# Patient Record
Sex: MALE | Race: WHITE | NOT HISPANIC OR LATINO
[De-identification: names, ages, dates, MRNs, and addresses within clinical notes are randomized per-mention and may not be internally consistent; named-entity substitution may affect disease eponyms.]

---

## 2022-07-14 PROBLEM — Z00.00 ENCOUNTER FOR PREVENTIVE HEALTH EXAMINATION: Status: ACTIVE | Noted: 2022-07-14

## 2022-07-15 ENCOUNTER — NON-APPOINTMENT (OUTPATIENT)
Age: 47
End: 2022-07-15

## 2022-07-15 ENCOUNTER — APPOINTMENT (OUTPATIENT)
Dept: INTERNAL MEDICINE | Facility: CLINIC | Age: 47
End: 2022-07-15

## 2022-07-15 VITALS
WEIGHT: 168.08 LBS | HEIGHT: 65.55 IN | SYSTOLIC BLOOD PRESSURE: 131 MMHG | OXYGEN SATURATION: 98 % | HEART RATE: 75 BPM | BODY MASS INDEX: 27.67 KG/M2 | TEMPERATURE: 98.5 F | DIASTOLIC BLOOD PRESSURE: 73 MMHG

## 2022-07-15 DIAGNOSIS — J45.909 UNSPECIFIED ASTHMA, UNCOMPLICATED: ICD-10-CM

## 2022-07-15 DIAGNOSIS — I73.00 RAYNAUD'S SYNDROME W/OUT GANGRENE: ICD-10-CM

## 2022-07-15 DIAGNOSIS — F90.9 ATTENTION-DEFICIT HYPERACTIVITY DISORDER, UNSPECIFIED TYPE: ICD-10-CM

## 2022-07-15 PROCEDURE — 99386 PREV VISIT NEW AGE 40-64: CPT | Mod: 25

## 2022-07-15 PROCEDURE — G0444 DEPRESSION SCREEN ANNUAL: CPT | Mod: 59

## 2022-07-15 PROCEDURE — 36415 COLL VENOUS BLD VENIPUNCTURE: CPT

## 2022-07-15 NOTE — ADDENDUM
[FreeTextEntry1] : I, Andreas Kolb, acted as a scribe on behalf of Dr. Dmitry Lopez MD, on 07/15/2022. \par \par All medical entries made by the scribe were at my, Dr. Dmitry Lopez MD, direction and personally dictated by me on 07/15/2022. I have reviewed the chart and agree that the record accurately reflects my personal performance of the history, physical exam, assessment and plan. I have also personally directed, reviewed, and agreed with the chart.

## 2022-07-15 NOTE — HISTORY OF PRESENT ILLNESS
[FreeTextEntry1] : Patient presents to establish care and annual physical exam.  [de-identified] : A 47 y/o M pt presents to office with Hx of Prolactinoma, diagnosed 2012 taking medication. He was having headaches and weight gain.\par Currently denies any headaches, double vision. Has not had MRI and f/u with endocrinology.\par Hx of asthma which has been stable. Triggered every time he gets sick, after exercise and seasonal allergies symptom include chest tightness. States he's having 1 episode per year.\par \par Patient c/o cold sensitivity on hand and feet. Toes does change in white yellowish after massaging turns back to red. Painful with cold weather. Denies any joint pain or thickening of skin.\par He does have dribbling. Denies any other urinary symptom or change in bowel habits.\par Interested in Colonoscopy. UTD with optometrist.\par SocHx: non smoker, social alcohol use\par FHx:Grandfather scleroderma, Father arthritis, Unicuspid cousin

## 2022-07-15 NOTE — ASSESSMENT
[FreeTextEntry1] : Annual Physical Exam\par -Blood work done today\par -Check A1c, lipid panel and Vitamin levels.\par -EKG done today with no abnormalities.\par -Check PSA levels.\par -Neurology and psychiatry referral given Hx of ADHD.\par -Advised to f/u with endocrinology given Hx of prolactinoma. Check prolactin levels\par -Name of GI provided for Colonoscopy.\par -Medication refills provided.\par -RTO annually or as needed

## 2022-07-15 NOTE — PHYSICAL EXAM
[No Acute Distress] : no acute distress [Well Nourished] : well nourished [Well Developed] : well developed [Well-Appearing] : well-appearing [Normal Sclera/Conjunctiva] : normal sclera/conjunctiva [PERRL] : pupils equal round and reactive to light [EOMI] : extraocular movements intact [Normal Outer Ear/Nose] : the outer ears and nose were normal in appearance [Normal Oropharynx] : the oropharynx was normal [No JVD] : no jugular venous distention [No Lymphadenopathy] : no lymphadenopathy [Supple] : supple [Thyroid Normal, No Nodules] : the thyroid was normal and there were no nodules present [No Respiratory Distress] : no respiratory distress  [No Accessory Muscle Use] : no accessory muscle use [Clear to Auscultation] : lungs were clear to auscultation bilaterally [Normal Rate] : normal rate  [Regular Rhythm] : with a regular rhythm [Normal S1, S2] : normal S1 and S2 [No Murmur] : no murmur heard [No Carotid Bruits] : no carotid bruits [No Abdominal Bruit] : a ~M bruit was not heard ~T in the abdomen [No Varicosities] : no varicosities [Pedal Pulses Present] : the pedal pulses are present [No Edema] : there was no peripheral edema [No Palpable Aorta] : no palpable aorta [No Extremity Clubbing/Cyanosis] : no extremity clubbing/cyanosis [Soft] : abdomen soft [Non Tender] : non-tender [Non-distended] : non-distended [No Masses] : no abdominal mass palpated [No HSM] : no HSM [Normal Bowel Sounds] : normal bowel sounds [Normal Posterior Cervical Nodes] : no posterior cervical lymphadenopathy [Normal Anterior Cervical Nodes] : no anterior cervical lymphadenopathy [No CVA Tenderness] : no CVA  tenderness [No Spinal Tenderness] : no spinal tenderness [No Joint Swelling] : no joint swelling [Grossly Normal Strength/Tone] : grossly normal strength/tone [No Rash] : no rash [Coordination Grossly Intact] : coordination grossly intact [No Focal Deficits] : no focal deficits [Normal Gait] : normal gait [Deep Tendon Reflexes (DTR)] : deep tendon reflexes were 2+ and symmetric [Normal Affect] : the affect was normal [Normal Insight/Judgement] : insight and judgment were intact [de-identified] : Pt declined prostate exam

## 2022-07-15 NOTE — HEALTH RISK ASSESSMENT
[Good] : ~his/her~  mood as  good [Never] : Never [Yes] : Yes [2 - 4 times a month (2 pts)] : 2-4 times a month (2 points) [3 or 4 (1 pt)] : 3 or 4  (1 point) [Less than monthly (1 pt)] : Less than monthly (1 point) [0] : 2) Feeling down, depressed, or hopeless: Not at all (0) [PHQ-2 Negative - No further assessment needed] : PHQ-2 Negative - No further assessment needed [FreeTextEntry1] : Health Maintenance [Audit-CScore] : 4 [[id number]] : 0

## 2022-07-19 LAB
25(OH)D3 SERPL-MCNC: 41.2 NG/ML
ALBUMIN SERPL ELPH-MCNC: 5 G/DL
ALP BLD-CCNC: 59 U/L
ALT SERPL-CCNC: 17 U/L
ANA SER IF-ACNC: NEGATIVE
ANION GAP SERPL CALC-SCNC: 16 MMOL/L
APPEARANCE: CLEAR
AST SERPL-CCNC: 19 U/L
BACTERIA: NEGATIVE
BASOPHILS # BLD AUTO: 0.06 K/UL
BASOPHILS NFR BLD AUTO: 0.8 %
BILIRUB SERPL-MCNC: 0.4 MG/DL
BILIRUBIN URINE: NEGATIVE
BLOOD URINE: NEGATIVE
BUN SERPL-MCNC: 16 MG/DL
CALCIUM SERPL-MCNC: 10.2 MG/DL
CHLORIDE SERPL-SCNC: 99 MMOL/L
CHOLEST SERPL-MCNC: 225 MG/DL
CO2 SERPL-SCNC: 24 MMOL/L
COLOR: NORMAL
CREAT SERPL-MCNC: 1.24 MG/DL
EGFR: 73 ML/MIN/1.73M2
EOSINOPHIL # BLD AUTO: 0.05 K/UL
EOSINOPHIL NFR BLD AUTO: 0.7 %
ESTIMATED AVERAGE GLUCOSE: 111 MG/DL
FSH SERPL-MCNC: 7.9 IU/L
GLUCOSE QUALITATIVE U: NEGATIVE
GLUCOSE SERPL-MCNC: 97 MG/DL
HBA1C MFR BLD HPLC: 5.5 %
HCT VFR BLD CALC: 43.5 %
HDLC SERPL-MCNC: 64 MG/DL
HGB BLD-MCNC: 15.3 G/DL
HYALINE CASTS: 0 /LPF
IMM GRANULOCYTES NFR BLD AUTO: 0.1 %
KETONES URINE: NEGATIVE
LDLC SERPL CALC-MCNC: 148 MG/DL
LEUKOCYTE ESTERASE URINE: NEGATIVE
LH SERPL-ACNC: 7.1 IU/L
LYMPHOCYTES # BLD AUTO: 1.69 K/UL
LYMPHOCYTES NFR BLD AUTO: 23.4 %
MAN DIFF?: NORMAL
MCHC RBC-ENTMCNC: 30.4 PG
MCHC RBC-ENTMCNC: 35.2 GM/DL
MCV RBC AUTO: 86.5 FL
MICROSCOPIC-UA: NORMAL
MONOCYTES # BLD AUTO: 0.49 K/UL
MONOCYTES NFR BLD AUTO: 6.8 %
NEUTROPHILS # BLD AUTO: 4.91 K/UL
NEUTROPHILS NFR BLD AUTO: 68.2 %
NITRITE URINE: NEGATIVE
NONHDLC SERPL-MCNC: 161 MG/DL
PH URINE: 7
PLATELET # BLD AUTO: 265 K/UL
POTASSIUM SERPL-SCNC: 4 MMOL/L
PROLACTIN SERPL-MCNC: 8.8 NG/ML
PROT SERPL-MCNC: 7.6 G/DL
PROTEIN URINE: NEGATIVE
PSA SERPL-MCNC: 0.29 NG/ML
RBC # BLD: 5.03 M/UL
RBC # FLD: 12.8 %
RED BLOOD CELLS URINE: 1 /HPF
SODIUM SERPL-SCNC: 139 MMOL/L
SPECIFIC GRAVITY URINE: 1.01
SQUAMOUS EPITHELIAL CELLS: 0 /HPF
TRIGL SERPL-MCNC: 65 MG/DL
TSH SERPL-ACNC: 1.78 UIU/ML
UROBILINOGEN URINE: NORMAL
VIT B12 SERPL-MCNC: 805 PG/ML
WBC # FLD AUTO: 7.21 K/UL
WHITE BLOOD CELLS URINE: 0 /HPF

## 2022-07-20 LAB
TESTOST FREE SERPL-MCNC: 8.3 PG/ML
TESTOST SERPL-MCNC: 517 NG/DL

## 2022-07-21 LAB — ACTH-ESO: 10 PG/ML

## 2022-07-25 LAB
CORTICOSTEROID BIND GLOBULIN: 2 MG/DL
CORTIS SERPL-MCNC: 8.5 UG/DL
CORTISOL, FREE: 0.5 UG/DL
PFCX: 5.9 %

## 2022-08-30 ENCOUNTER — APPOINTMENT (OUTPATIENT)
Dept: PSYCHIATRY | Facility: CLINIC | Age: 47
End: 2022-08-30

## 2022-09-28 ENCOUNTER — APPOINTMENT (OUTPATIENT)
Dept: PSYCHIATRY | Facility: CLINIC | Age: 47
End: 2022-09-28

## 2022-09-28 DIAGNOSIS — F41.9 ANXIETY DISORDER, UNSPECIFIED: ICD-10-CM

## 2022-09-28 DIAGNOSIS — F51.02 ADJUSTMENT INSOMNIA: ICD-10-CM

## 2022-09-28 DIAGNOSIS — F32.A ANXIETY DISORDER, UNSPECIFIED: ICD-10-CM

## 2022-09-28 PROCEDURE — 99205 OFFICE O/P NEW HI 60 MIN: CPT

## 2022-09-28 RX ORDER — DEXTROAMPHETAMINE SACCHARATE, AMPHETAMINE ASPARTATE, DEXTROAMPHETAMINE SULFATE, AND AMPHETAMINE SULFATE 2.5; 2.5; 2.5; 2.5 MG/1; MG/1; MG/1; MG/1
10 TABLET ORAL
Qty: 30 | Refills: 0 | Status: COMPLETED | COMMUNITY
Start: 2022-07-15 | End: 2022-09-28

## 2022-09-28 RX ORDER — DEXTROAMPHETAMINE SACCHARATE, AMPHETAMINE ASPARTATE, DEXTROAMPHETAMINE SULFATE, AND AMPHETAMINE SULFATE 5; 5; 5; 5 MG/1; MG/1; MG/1; MG/1
20 TABLET ORAL
Qty: 30 | Refills: 0 | Status: COMPLETED | COMMUNITY
Start: 2022-07-15 | End: 2022-09-28

## 2022-10-26 ENCOUNTER — APPOINTMENT (OUTPATIENT)
Dept: PSYCHIATRY | Facility: CLINIC | Age: 47
End: 2022-10-26

## 2022-10-26 PROCEDURE — 99214 OFFICE O/P EST MOD 30 MIN: CPT

## 2022-10-26 NOTE — SOCIAL HISTORY
[FreeTextEntry1] : Born: [place], [place]\par Siblings: 1/2 sister\par Parents:   when he was 5 yoa. states he grew up in a  toxic environment. \par Education: J.D.\par Employment.  for 20 years\par /Kids:  10 years ago and now engaged to be . Has 15 yo son and 10 yo daughter form the first marriage and 7 month old daughter from current  relationship. \par Abuse: denies\par Legal:  8/2020 DWI\par

## 2022-10-26 NOTE — PLAN
[FreeTextEntry4] : Assessment: Patient is a 47 yo male with h/o ADHD, depression and anxiety seen today for medication management. Patient is compliant with his medications, tolerating it well without any side effects. I-STOP was checked without any problems.\par \par I-STOP:\par Patient Name: Darren FreedBirth Date: 1975\par Address: [address] [place] [id number]: Male\par Rx Written	Rx Dispensed	Drug	Quantity	Days Supply	Prescriber Name	Prescriber Daysi #	Payment Method	Dispenser\par 08/16/2022	08/21/2022	dextroamp-amphetamin 10 mg tab	30	30	Vlantis, [place]	[id number]	Insurance	[id number]\par 08/16/2022	08/21/2022	dextroamp-amphetamin 20 mg tab	30	30	Vlantis, Dmitry	[id number]	Insurance	[facility][id number]\par 07/15/2022	07/17/2022	dextroamp-amphetamin 20 mg tab	30	30	Vlantis, [place]	[id number]	Insurance	[facility][id number]\par 07/15/2022	07/17/2022	dextroamp-amphetamin 10 mg tab	30	30	Vlantis, [place]	[id number]	Insurance	[facility][id number]\par 05/18/2022	05/19/2022	dextroamp-amphetamin 20 mg tab	60	30	Fermin Josue MD	[id number]	Insurance	[facility] #[id number]\par \par PLAN:\par Told patient to make an appointment to see the Neurologist for MRI brain to see what the status of the prolactinoma is. Still on Cabergoline\par DECREASE MARIJUANA \par Increase Lexapro from 10 to 15 mg PO QAM for depression and anxiety\par Continue Trazodone 50 mg PO QHS for insomnia\par D/C Adderall 10 mg PO QD for ADHD\par - Discussed risks and benefits of medications including side effects of GI and sexual with SSRI. Alternative strategies including no intervention discussed with patient. Patient consents to current medications as prescribed.\par - Discussed with patient regarding importance of abstinence and sobriety from alcohol and drugs. Educated about relationship between worsening mood/anxiety symptoms and drug use and improvement of symptoms with abstinence. \par - Discussed about unpredictable effects including cardiorespiratory collapse from the combination of illicit drugs and prescribed medications. Patient verbalized understanding.\par - Patient understands to contact clinic prn with concerns and agrees to call 911 or go to nearest ER if symptoms worsen.\par - Next appointment made in 1 month. Patient left the office without any distress.\par \par \par \par

## 2022-10-26 NOTE — PHYSICAL EXAM
[None] : none [Cooperative] : cooperative [Constricted] : constricted [Clear] : clear [Linear/Goal Directed] : linear/goal directed [Average] : average [WNL] : within normal limits [FreeTextEntry1] : multiple tattoos [FreeTextEntry8] : "I'm depressed."

## 2022-10-26 NOTE — FAMILY HISTORY
[FreeTextEntry1] : Mother: depression and anxiety. On Prozac\par Maternal side, maternal great grandfather: hung himself. Maternal grandmother: saw it developed PTSD\par 13 yo Son: ADHD and anxiety. On Prozac

## 2022-10-26 NOTE — HISTORY OF PRESENT ILLNESS
[FreeTextEntry1] : \par Patient is here in the office for face to face interview for initial psychiatric evaluation \par \par ID: Pt is a 46 year-old  male,  with 3 kids, employed as a  living with his wife 7 month old daughter seen today for psychiatric evaluation and medications management. \par \par HPI: Patient has been suffering from depression and anxiety for the past 2 years. States stressors contributing to his depression and anxiety: litigation he was involved involving harassment. February 2022 son had to be taken out of school which was stressful due to disciplinary actions. Patient is  10 years ago. She moved away with the kids. States there was a lot of moving around due to divorce. States he guilty about it to this day that he may not have been there as much as he should have been for the kids sake. States he self medicated his depression and anxiety with drugs and alcohol..  \par \par ADHD. Diagnosed at 11 by pediatrician. From [age] he was medicated on Adderall. From [age] no medication and dealt with it on his own. \par \par At 34 he was diagnosed with hyperprolactoma. Was put on Cabergoline. Since being on that medication he has not been able to concentrate. Has done MRI but it was 5 years ago and never went back. Current PRL level in July 2022 is WNL. \par \par Currently on Adderall 20 mg and Traozodne 100 mg. \par \par Depression: low mood and anhedonia. +Guilty\par Anxiety: endorses to anxiety in the form of worrying, rumination, somatization (Fatigue, and headache), OCD (had a night time routine to touch his doors and his light switch neil  special way)\par Sleep: 10pm-6:30am with marijuana. States he si selwyn to go to sleep but can't maintain his sleep due to ruminaiton\par Denies any problems with appetite. Energy, concentration, and motivation are all decreased. Denies any AVH, SI or HI. \par \par Hypomanic symptoms: denies\par Substance use hx: \par Nicotine: quit 2 years ago \par Alcohol started at age 16. Highest amt was 10-12 drinks everyday for 2-3 years. Now drinks that amt once a month when going out. \par Last drink was Saturday where he had 2 glasses of wine\par Marijuana: started at 18. States he bought an 1/8 amt 8 months ago and states he still has some left. States he does "tiny bowl" everyday for the past 2 months for sleep. States he ran out of Trazodone so he started using marijuana and that has helped him. Mood: anxious and paranoid. sometimes. \par Cocaine: started at 23. States he just did a few bumps. restarted again at 36 and did few bumps more. During [age] he did $200 dollars per year. Last use was Aug 2022 could bumps. \par Sheron's experimented with 5 years ago\par Caffeine: 8 cups of coffee, 150 mg of pre workout 3 times per week.\par Benzodiazepine: Xanax 2 years ago for sleep. States i woke up with the best rested feeling and energized.  [FreeTextEntry2] : H/O: depression and anxiety. ADHD. Diagnosed at 11 by pediatrician. From [age] he was medicated on Adderall. From [age] no medication and dealt with it on his own. At 34 he was diagnosed with hyperprolactoma. Was put on Cabergoline. Since being on that medication he has not been able to concentrate. Has done MRI but it was 5 years ago and never went back. Current PRL level in July 2022 is WNL. \par Inpatient hospitalization: denies \par Past SI: denies\par Therapist: during childhood but not now. \par Psychiatrist: denies\par Medication trials: Ritalin, Dexadrine, and Xanax\par Current medications: Adderall 20 and Trazodone 100 mg. \par Firearms: 1 hang gun in [place]\par Medical:

## 2022-10-27 ENCOUNTER — NON-APPOINTMENT (OUTPATIENT)
Age: 47
End: 2022-10-27

## 2022-10-27 ENCOUNTER — APPOINTMENT (OUTPATIENT)
Dept: ENDOCRINOLOGY | Facility: CLINIC | Age: 47
End: 2022-10-27

## 2022-10-27 VITALS
HEART RATE: 45 BPM | DIASTOLIC BLOOD PRESSURE: 74 MMHG | SYSTOLIC BLOOD PRESSURE: 112 MMHG | WEIGHT: 178.78 LBS | OXYGEN SATURATION: 95 % | BODY MASS INDEX: 29.79 KG/M2 | HEIGHT: 65 IN

## 2022-10-27 PROCEDURE — 99205 OFFICE O/P NEW HI 60 MIN: CPT

## 2022-10-27 NOTE — ASSESSMENT
[FreeTextEntry1] : This is a 47-year-old male with hyperprolactinemia, prolactinoma, asthma, hyperlipidemia, ADHD, anxiety, depression, here for consultation.\par Reports prolactinoma was diagnosed at the age of 36 after he experienced headaches.  Per patient prolactin level was found to be approximately 120 at that time.  He was started on cabergoline 0.5 mg weekly which he currently takes.  Reports testosterone level was also low at that time and increased after he started cabergoline.\par Last MRI pituitary was 5 years ago.\par Denies blurry vision or headache.\par Blood work from July 2022 showed normal prolactin, TSH, cortisol, testosterone, ACTH.\par Check MRI pituitary.\par Will consider decreasing cabergoline to half a tablet weekly.  Patient has some apprehension about this because he reports cabergoline helped him by increasing testosterone.  Will decide pending results.\par \par

## 2022-10-27 NOTE — REASON FOR VISIT
[Consultation] : a consultation visit [Pituitary Evaluation/ Disorder] : pituitary evaluation/disorder [FreeTextEntry2] : Dr Lopez

## 2022-10-27 NOTE — REVIEW OF SYSTEMS
[Depression] : depression [Anxiety] : anxiety [Negative] : Heme/Lymph [All other systems negative] : All other systems negative

## 2022-10-27 NOTE — HISTORY OF PRESENT ILLNESS
[FreeTextEntry1] : CC: Prolactinoma\par This is a 47-year-old male with hyperprolactinemia, prolactinoma, asthma, hyperlipidemia, ADHD, anxiety, depression, here for consultation.\par Reports prolactinoma was diagnosed at the age of 36 after he experienced headaches.  Per patient prolactin level was found to be approximately 120 at that time.  He was started on cabergoline 0.5 mg weekly which he currently takes.  Reports testosterone level was also low at that time and increased after he started cabergoline.\par Last MRI pituitary was 5 years ago.\par Denies blurry vision or headache.\par \par

## 2022-11-18 ENCOUNTER — APPOINTMENT (OUTPATIENT)
Dept: MRI IMAGING | Facility: CLINIC | Age: 47
End: 2022-11-18

## 2022-11-18 ENCOUNTER — OUTPATIENT (OUTPATIENT)
Dept: OUTPATIENT SERVICES | Facility: HOSPITAL | Age: 47
LOS: 1 days | End: 2022-11-18
Payer: COMMERCIAL

## 2022-11-18 DIAGNOSIS — D35.2 BENIGN NEOPLASM OF PITUITARY GLAND: ICD-10-CM

## 2022-11-18 PROCEDURE — 70553 MRI BRAIN STEM W/O & W/DYE: CPT | Mod: 26

## 2022-11-18 PROCEDURE — A9585: CPT

## 2022-11-18 PROCEDURE — 70553 MRI BRAIN STEM W/O & W/DYE: CPT

## 2022-11-30 ENCOUNTER — APPOINTMENT (OUTPATIENT)
Dept: PSYCHIATRY | Facility: CLINIC | Age: 47
End: 2022-11-30

## 2022-12-26 ENCOUNTER — NON-APPOINTMENT (OUTPATIENT)
Age: 47
End: 2022-12-26

## 2022-12-27 ENCOUNTER — NON-APPOINTMENT (OUTPATIENT)
Age: 47
End: 2022-12-27

## 2023-01-31 ENCOUNTER — APPOINTMENT (OUTPATIENT)
Dept: PSYCHIATRY | Facility: CLINIC | Age: 48
End: 2023-01-31

## 2023-02-03 ENCOUNTER — APPOINTMENT (OUTPATIENT)
Dept: PSYCHIATRY | Facility: CLINIC | Age: 48
End: 2023-02-03

## 2023-02-03 ENCOUNTER — APPOINTMENT (OUTPATIENT)
Dept: PSYCHIATRY | Facility: CLINIC | Age: 48
End: 2023-02-03
Payer: COMMERCIAL

## 2023-02-03 PROCEDURE — 99214 OFFICE O/P EST MOD 30 MIN: CPT | Mod: 95

## 2023-02-03 NOTE — PLAN
[FreeTextEntry4] : Assessment: Patient is a 47 yo male with h/o ADHD, depression and anxiety seen today for medication management. Patient is compliant with his medications, tolerating it well without any side effects. I-STOP was checked without any problems.\par \par I-STOP:\par Patient Name: Darren FreedBirth Date: 1975\par Address: [address] [place] [id number]: Male\par Rx Written	Rx Dispensed	Drug	Quantity	Days Supply	Prescriber Name	Prescriber Daysi #	Payment Method	Dispenser\par 08/16/2022	08/21/2022	dextroamp-amphetamin 10 mg tab	30	30	Vlantis, [place]	[id number]	Insurance	[id number]\par 08/16/2022	08/21/2022	dextroamp-amphetamin 20 mg tab	30	30	Vlantis, Dmitry	[id number]	Insurance	[facility][id number]\par 07/15/2022	07/17/2022	dextroamp-amphetamin 20 mg tab	30	30	Vlantis, [place]	[id number]	Insurance	[facility][id number]\par 07/15/2022	07/17/2022	dextroamp-amphetamin 10 mg tab	30	30	Vlantis, [place]	[id number]	Insurance	[facility][id number]\par 05/18/2022	05/19/2022	dextroamp-amphetamin 20 mg tab	60	30	Fermin Josue MD	[id number]	Insurance	[facility] #[id number]\par \par PLAN:\par He stopped taking Cabergoline as July 2022 PRL is normal. MRI pituitary is normal\par DECREASE MARIJUANA \par D/C Lexapro from 10 to 15 mg PO QAM for depression and anxiety. Patient stopped on his own. "Made me feel flat and states he had anxiety taking ti only after 1 week."\par Continue Trazodone 50 mg PO QHS for insomnia\par Continue Adderall 10 mg PO QD for ADHD\par Start Wellbutrin  mg PO QAM for depression, low motivation, energy, concentration and decrease SSRI induced sexual dysfunction.\par - Discussed risks and benefits of medications including side effects of GI and sexual with SSRI. Alternative strategies including no intervention discussed with patient. Patient consents to current medications as prescribed.\par - Discussed with patient regarding importance of abstinence and sobriety from alcohol and drugs. Educated about relationship between worsening mood/anxiety symptoms and drug use and improvement of symptoms with abstinence. \par - Discussed about unpredictable effects including cardiorespiratory collapse from the combination of illicit drugs and prescribed medications. Patient verbalized understanding.\par - Patient understands to contact clinic prn with concerns and agrees to call 911 or go to nearest ER if symptoms worsen.\par - Next appointment made in 1 month. Patient left the office without any distress.\par \par \par \par

## 2023-02-03 NOTE — HISTORY OF PRESENT ILLNESS
[Home] : at home, [unfilled] , at the time of the visit. [Medical Office: ([facility])___] : at the medical office located in  [Verbal consent obtained from patient] : the patient, [unfilled] [FreeTextEntry1] : Last month Lexapro 10 mg was increased to 15 mg. States he stopped taking 1 week after writer prescribed it due to feeling "flat and increase anxiety." \par Last Adderall was [place] day weekend. States when he is not taking the Adderall he is tired. Has been taking the Adderall 10 mg for 2 weeks 3-4 days per week. Has a problem with motivation, and energy. Marijuana is everyday especially at night. States he smokes for insomnia and just to relax. "its my class of wine for me." States he smokes it 5 days a week. States he was not smoking when he was on the Lexapro. \par Denies the marijuana making him feel anxious or paranoid. \par Mood: depressed and anxious. His fiance is a RN and she is working and he is at home taking care of the ids and he has other kids from a previous relationship and taking care of them. "Hard to coordinate."\par Sleep: 8 hours per night. No trazodone. Made him feel too groggy and not rested. \par Appetite is good\par Energy, concentration and motivation are all only improved if on the Adderall. \par Denies any AVH, SI or HI.

## 2023-02-03 NOTE — SOCIAL HISTORY
[FreeTextEntry1] : Born: [place], [place]\par Siblings: 1/2 sister\par Parents:   when he was 5 yoa. states he grew up in a  toxic environment. \par Education: J.D.\par Employment.  for 20 years\par /Kids:  10 years ago and now engaged to be . Has 13 yo son and 10 yo daughter form the first marriage and 7 month old daughter from current  relationship. \par Abuse: denies\par Legal:  8/2020 DWI\par

## 2023-02-12 ENCOUNTER — NON-APPOINTMENT (OUTPATIENT)
Age: 48
End: 2023-02-12

## 2023-02-23 ENCOUNTER — APPOINTMENT (OUTPATIENT)
Dept: INTERNAL MEDICINE | Facility: CLINIC | Age: 48
End: 2023-02-23
Payer: COMMERCIAL

## 2023-02-23 DIAGNOSIS — M25.559 PAIN IN UNSPECIFIED HIP: ICD-10-CM

## 2023-02-23 PROCEDURE — 99441: CPT | Mod: 95

## 2023-02-23 RX ORDER — ALBUTEROL SULFATE 90 UG/1
108 (90 BASE) AEROSOL, METERED RESPIRATORY (INHALATION)
Qty: 1 | Refills: 3 | Status: ACTIVE | COMMUNITY
Start: 2022-07-15 | End: 1900-01-01

## 2023-02-27 PROBLEM — M25.559 PAIN, HIP: Status: ACTIVE | Noted: 2023-02-23

## 2023-02-27 NOTE — ASSESSMENT
[FreeTextEntry1] : We will start off with physical therapy if no improvement to call office.  Possibly muscular possible hip flexor issue.  Is able to weight-bear no red flag symptoms denies any weakness.

## 2023-02-27 NOTE — HISTORY OF PRESENT ILLNESS
[Home] : at home, [unfilled] , at the time of the visit. [Medical Office: ([facility])___] : at the medical office located in  [FreeTextEntry8] : Patient presents to the office for telehealth visit states that he has right-sided hip pain denies any trauma denies any pain on affected side, denies any back pain radicular symptoms, denies any discomfort on internal rotation.  Denies any fevers chills night sweats swelling.

## 2023-03-10 ENCOUNTER — APPOINTMENT (OUTPATIENT)
Dept: PSYCHIATRY | Facility: CLINIC | Age: 48
End: 2023-03-10
Payer: COMMERCIAL

## 2023-03-10 PROCEDURE — 99214 OFFICE O/P EST MOD 30 MIN: CPT | Mod: 95

## 2023-03-10 NOTE — HISTORY OF PRESENT ILLNESS
[FreeTextEntry1] : Last month Wellbutrin  mg was started on the patient. States "I love it ." More energy, more motivation,. easier to get out of bed. Doing well on Adderall 10mg. +Drug holidays. Has been taking the Adderall 10 mg 3-4 days per week. Marijuana is everyday especially at night. States he smokes for insomnia and just to relax. States he smokes it 5 days a week.  \par Denies the marijuana making him feel anxious or paranoid. \par Mood: stable less depression and less sleeping\par Sleep: 8 hours per night. No trazodone. Made him feel too groggy and not rested. \par Appetite: decreased. \par Energy, concentration and motivation are all only improved if on the Adderall +Wellbutrin.  \par Denies any AVH, SI or HI.  [Home] : at home, [unfilled] , at the time of the visit. [Medical Office: ([facility])___] : at the medical office located in  [Verbal consent obtained from patient] : the patient, [unfilled]

## 2023-03-10 NOTE — PLAN
[FreeTextEntry4] : Assessment: Patient is a 47 yo male with h/o ADHD, depression and anxiety seen today for medication management. Patient is compliant with his medications, tolerating it well without any side effects. I-STOP was checked without any problems.\par \par I-STOP:\par Patient Name: Darren FreedBirth Date: 1975\par Address: [address] [place] [id number]: Male\par Rx Written	Rx Dispensed	Drug	Quantity	Days Supply	Prescriber Name	Prescriber Daysi #	Payment Method	Dispenser\par 08/16/2022	08/21/2022	dextroamp-amphetamin 10 mg tab	30	30	Vlantis, [place]	[id number]	Insurance	[facility] #[id number]\par 08/16/2022	08/21/2022	dextroamp-amphetamin 20 mg tab	30	30	Vlantis, Dmitry	[id number]	Insurance	[facility][id number]\par 07/15/2022	07/17/2022	dextroamp-amphetamin 20 mg tab	30	30	Vlantis, [place]	[id number]	Insurance	[facility] #[id number]\par 07/15/2022	07/17/2022	dextroamp-amphetamin 10 mg tab	30	30	Vlantis, [place]	[id number]	Insurance	[facility] #[id number]\par 05/18/2022	05/19/2022	dextroamp-amphetamin 20 mg tab	60	30	Fermin Josue MD	[id number]	Insurance	[facility] #[id number]\par \par PLAN:\par He stopped taking Cabergoline as July 2022 PRL is normal. MRI pituitary is normal\par DECREASE MARIJUANA \par D/C Lexapro from 10 to 15 mg PO QAM for depression and anxiety. Patient stopped on his own. "Made me feel flat and states he had anxiety taking ti only after 1 week."\par NO ANXIETY COVERAGE\par Continue Trazodone 50 mg PO QHS for insomnia\par Continue Adderall 10 mg PO QD for ADHD\par Increase Wellbutrin  to 300 mg PO QAM for depression, low motivation, energy, concentration and decrease SSRI induced sexual dysfunction.\par - Discussed risks and benefits of medications including side effects of GI and sexual with SSRI. Alternative strategies including no intervention discussed with patient. Patient consents to current medications as prescribed.\par - Discussed with patient regarding importance of abstinence and sobriety from alcohol and drugs. Educated about relationship between worsening mood/anxiety symptoms and drug use and improvement of symptoms with abstinence. \par - Discussed about unpredictable effects including cardiorespiratory collapse from the combination of illicit drugs and prescribed medications. Patient verbalized understanding.\par - Patient understands to contact clinic prn with concerns and agrees to call 911 or go to nearest ER if symptoms worsen.\par - Next appointment made in 1 month. Patient left the office without any distress.\par \par \par \par

## 2023-03-10 NOTE — SOCIAL HISTORY
[FreeTextEntry1] : Born: [place], [place]\par Siblings: 1/2 sister\par Parents:   when he was 5 yoa. states he grew up in a  toxic environment. \par Education: J.D.\par Employment.  for 20 years\par /Kids:  10 years ago and now engaged to be . Has 15 yo son and 10 yo daughter form the first marriage and 7 month old daughter from current  relationship. \par Abuse: denies\par Legal:  8/2020 DWI\par

## 2023-03-10 NOTE — FAMILY HISTORY
[FreeTextEntry1] : Mother: depression and anxiety. On Prozac\par Maternal side, maternal great grandfather: hung himself. Maternal grandmother: saw it developed PTSD\par 15 yo Son: ADHD and anxiety. On Prozac

## 2023-03-15 ENCOUNTER — NON-APPOINTMENT (OUTPATIENT)
Age: 48
End: 2023-03-15

## 2023-05-13 ENCOUNTER — NON-APPOINTMENT (OUTPATIENT)
Age: 48
End: 2023-05-13

## 2023-06-12 ENCOUNTER — APPOINTMENT (OUTPATIENT)
Dept: PSYCHIATRY | Facility: CLINIC | Age: 48
End: 2023-06-12
Payer: COMMERCIAL

## 2023-06-12 PROCEDURE — 99214 OFFICE O/P EST MOD 30 MIN: CPT | Mod: 95

## 2023-06-12 NOTE — PLAN
[FreeTextEntry4] : Assessment: Patient is a 45 yo male with h/o ADHD, depression and anxiety seen today for medication management. Patient is compliant with his medications, tolerating it well without any side effects. I-STOP was checked without any problems.\par \par I-STOP:\par Patient Name: Darren FreedBirth Date: 1975\par Address: [address] [place] [id number]: Male\par Rx Written	Rx Dispensed	Drug	Quantity	Days Supply	Prescriber Name	Prescriber Daysi #	Payment Method	Dispenser\par 08/16/2022	08/21/2022	dextroamp-amphetamin 10 mg tab	30	30	Vlantis, Caroleen	[id number]	Insurance	[facility] #[id number]\par 08/16/2022	08/21/2022	dextroamp-amphetamin 20 mg tab	30	30	Vlantis, Dmitry	[id number]	Insurance	[facility][id number]\par 07/15/2022	07/17/2022	dextroamp-amphetamin 20 mg tab	30	30	Vlantis, Caroleen	[id number]	Insurance	[facility] #[id number]\par 07/15/2022	07/17/2022	dextroamp-amphetamin 10 mg tab	30	30	Vlantis, Caroleen	[id number]	Insurance	[facility] #[id number]\par 05/18/2022	05/19/2022	dextroamp-amphetamin 20 mg tab	60	30	Fermin Josue MD	[id number]	Insurance	[facility] #[id number]\par \par PLAN:\par He stopped taking Cabergoline as July 2022 PRL is normal. MRI pituitary is normal\par DECREASE MARIJUANA \par D/C Lexapro from 10 to 15 mg PO QAM for depression and anxiety. Patient stopped on his own. "Made me feel flat and states he had anxiety taking ti only after 1 week."\par NO ANXIETY COVERAGE\par D/C Trazodone 50 mg PO QHS for insomnia\par Continue Adderall 10 mg PO QD for ADHD\par Decrease Wellbutrin  to 150 mg PO QAM for depression, low motivation, energy, concentration and decrease SSRI induced sexual dysfunction. "Too stimulating."\par - Discussed risks and benefits of medications including side effects of GI and sexual with SSRI. Alternative strategies including no intervention discussed with patient. Patient consents to current medications as prescribed.\par - Discussed with patient regarding importance of abstinence and sobriety from alcohol and drugs. Educated about relationship between worsening mood/anxiety symptoms and drug use and improvement of symptoms with abstinence. \par - Discussed about unpredictable effects including cardiorespiratory collapse from the combination of illicit drugs and prescribed medications. Patient verbalized understanding.\par - Patient understands to contact clinic prn with concerns and agrees to call 911 or go to nearest ER if symptoms worsen.\par - Next appointment made in 1 month. Patient left the office without any distress.\par \par \par \par

## 2023-06-12 NOTE — SOCIAL HISTORY
[FreeTextEntry1] : Born: [place], [place]\par Siblings: 1/2 sister\par Parents:   when he was 5 yoa. states he grew up in a  toxic environment. \par Education: J.D.\par Employment.  for 20 years\par /Kids:  10 years ago and now engaged to be . Has 13 yo son and 10 yo daughter form the first marriage and 7 month old daughter from current  relationship. \par Abuse: denies\par Legal:  8/2020 DWI\par

## 2023-06-12 NOTE — HISTORY OF PRESENT ILLNESS
[FreeTextEntry1] : \par Patient is here for 1 month followup visit via telehealth\par \par \par Last month Wellbutrin  was increased to XL 30 mg and Lexapro was d/c. States his energy is better. Takes the Adderall 3 days a week. \par \par States there was 1 day in the last couple of weeks he has noticed he only had 4-5 hours of sleep and he woke up and was making food, cleaning the kitchen, exercising. "States he felt great." Asked himself "Am i manic?" Did an online exam to see if she was bipolar and it said he was?  \par Hypomanic symptoms: irritability, impulsive (hypersexual in the past), One day decrease need for sleep. He has also noticed he is quick to cry . More sentimental when either reading a book or kids are growing up fast. \par \par Doing well on Adderall 10mg. +Drug holidays. Marijuana is everyday especially at night. States he smokes for insomnia and just to relax. States he smokes it 5 days a week.  \par Denies the marijuana making him feel anxious or paranoid. \par Mood: stable less depression and less sleeping\par Sleep: 8 hours per night. No trazodone. Made him feel too groggy and not rested. \par Appetite: better. \par Energy, concentration and motivation are all only improved if on the Adderall +Wellbutrin.  \par Denies any AVH, SI or HI.  [Home] : at home, [unfilled] , at the time of the visit. [Medical Office: ([facility])___] : at the medical office located in  [Verbal consent obtained from patient] : the patient, [unfilled]

## 2023-08-14 ENCOUNTER — NON-APPOINTMENT (OUTPATIENT)
Age: 48
End: 2023-08-14

## 2023-08-28 ENCOUNTER — APPOINTMENT (OUTPATIENT)
Dept: PSYCHIATRY | Facility: CLINIC | Age: 48
End: 2023-08-28
Payer: COMMERCIAL

## 2023-08-28 PROCEDURE — 99214 OFFICE O/P EST MOD 30 MIN: CPT

## 2023-08-28 RX ORDER — HYDROXYZINE PAMOATE 25 MG/1
25 CAPSULE ORAL
Qty: 30 | Refills: 0 | Status: ACTIVE | COMMUNITY
Start: 2023-08-28 | End: 1900-01-01

## 2023-08-28 RX ORDER — ESCITALOPRAM OXALATE 5 MG/1
5 TABLET ORAL DAILY
Qty: 30 | Refills: 0 | Status: COMPLETED | COMMUNITY
Start: 2022-09-28 | End: 2023-08-28

## 2023-08-28 RX ORDER — ESCITALOPRAM OXALATE 10 MG/1
10 TABLET ORAL
Qty: 30 | Refills: 0 | Status: COMPLETED | COMMUNITY
Start: 2022-09-28 | End: 2023-08-28

## 2023-08-28 RX ORDER — TRAZODONE HYDROCHLORIDE 50 MG/1
50 TABLET ORAL
Qty: 60 | Refills: 0 | Status: COMPLETED | COMMUNITY
Start: 2022-09-28 | End: 2023-08-28

## 2023-08-28 NOTE — HISTORY OF PRESENT ILLNESS
[FreeTextEntry1] : Patient is here in the office for face to face interview with writer for 2 month follow-up visit.  At that time the Wellbutrin  was decreased to 150 mg. States he has been fluctuating on the dose. "Some days i take 150 and others 300 mg." Patient was told to take the same dose everyday rather than fluctuating it. States he is on Adderall 10 mg. +Drug holiday. Feels on the Adderall XR 10 mg he has a crash. Takes it at 7:30 am and wears off or he "crashes" by 5pm. States during crash he feels fatigue, irritability, and just gold."  Mood: anxiety and depression. States he self-medication his anxiety with edibles every night. States it helps with his mood and sleep.  Sleep: 8 hours per night. 10pm-4-8 am as he has a daughter. Marijuana is everyday especially at night. States he takes an edible every night for insomnia and just to relax. States he uses edibles every night.  Denies the marijuana making him feel anxious or paranoid.  Appetite: at the end of the day he is eating junk like ice cream.  Does intermittent fasting.  Energy, concentration and motivation are all only improved if on the Adderall +Wellbutrin.   Denies any AVH, SI or HI.

## 2023-08-28 NOTE — PLAN
[FreeTextEntry4] : Assessment: Patient is a 47 yo male with h/o ADHD, depression and anxiety seen today for medication management. Patient is compliant with his medications, tolerating it well without any side effects. I-STOP was checked without any problems.  I-STOP: Patient Name: Darren Harrison Date: 1975 Address: [address] [place] [id number]: Male Rx Written	Rx Dispensed	Drug	Quantity	Days Supply	Prescriber Name	Prescriber Daysi #	Payment Method	Dispenser 08/16/2022	08/21/2022	dextroamp-amphetamin 10 mg tab	30	30	Vlantis, Dmitry	[id number]	Insurance	[facility][id number] 08/16/2022	08/21/2022	dextroamp-amphetamin 20 mg tab	30	30	Vlantis, [place]	[id number]	Insurance	[facility][id number] 07/15/2022	07/17/2022	dextroamp-amphetamin 20 mg tab	30	30	Vlantis, [place]	[id number]	Insurance	[facility][id number] 07/15/2022	07/17/2022	dextroamp-amphetamin 10 mg tab	30	30	Vlantis, Dmitry	[id number]	Insurance	[facility] #[id number] 05/18/2022	05/19/2022	dextroamp-amphetamin 20 mg tab	60	30	Fermin Josue MD	[id number]	Insurance	[facility] #[id number]  PLAN: Ordered PRL level to be done Has been getting headaches. He stopped taking Cabergoline as July 2022 PRL is normal. MRI pituitary is normal in 2022 was normal.  Start Vistaril 10 mg PO QHS for insomnia. Continue Adderall 10 mg PO QD for ADHD Increase Wellbutrin  to 300 mg PO QAM for depression, low motivation, energy, concentration and decrease SSRI induced sexual dysfunction. "Too stimulating." Start Zoloft 25 mg x 5 days and then increase 50 mg PO QD for depression and anxiety D/C Lexapro from 10 to 15 mg PO QAM for depression and anxiety. Patient stopped on his own. "Made me feel flat and states he had anxiety taking ti only after 1 week." D/C Trazodone 50 mg PO QHS for insomnia - Discussed risks and benefits of medications including side effects of GI and sexual with SSRI. Alternative strategies including no intervention discussed with patient. Patient consents to current medications as prescribed. - Discussed with patient regarding importance of abstinence and sobriety from alcohol and drugs. Educated about relationship between worsening mood/anxiety symptoms and drug use and improvement of symptoms with abstinence.  - Discussed about unpredictable effects including cardiorespiratory collapse from the combination of illicit drugs and prescribed medications. Patient verbalized understanding. - Patient understands to contact clinic prn with concerns and agrees to call 911 or go to nearest ER if symptoms worsen. - Next appointment made in 1 month. Patient left the office without any distress.

## 2023-08-28 NOTE — SOCIAL HISTORY
[FreeTextEntry1] : Born: [place], [place]\par  Siblings: 1/2 sister\par  Parents:   when he was 5 yoa. states he grew up in a  toxic environment. \par  Education: J.D.\par  Employment.  for 20 years\par  /Kids:  10 years ago and now engaged to be . Has 15 yo son and 10 yo daughter form the first marriage and 7 month old daughter from current  relationship. \par  Abuse: denies\par  Legal:  8/2020 DWI\par

## 2023-08-30 LAB — PROLACTIN SERPL-MCNC: 26.6 NG/ML

## 2023-09-04 ENCOUNTER — NON-APPOINTMENT (OUTPATIENT)
Age: 48
End: 2023-09-04

## 2023-09-07 ENCOUNTER — TRANSCRIPTION ENCOUNTER (OUTPATIENT)
Age: 48
End: 2023-09-07

## 2023-09-13 ENCOUNTER — APPOINTMENT (OUTPATIENT)
Dept: ENDOCRINOLOGY | Facility: CLINIC | Age: 48
End: 2023-09-13
Payer: COMMERCIAL

## 2023-09-13 VITALS
HEIGHT: 66 IN | SYSTOLIC BLOOD PRESSURE: 121 MMHG | OXYGEN SATURATION: 99 % | DIASTOLIC BLOOD PRESSURE: 83 MMHG | WEIGHT: 181 LBS | HEART RATE: 78 BPM | TEMPERATURE: 98.2 F | BODY MASS INDEX: 29.09 KG/M2

## 2023-09-13 DIAGNOSIS — Z86.018 PERSONAL HISTORY OF OTHER BENIGN NEOPLASM: ICD-10-CM

## 2023-09-13 DIAGNOSIS — E23.6 OTHER DISORDERS OF PITUITARY GLAND: ICD-10-CM

## 2023-09-13 PROCEDURE — 99214 OFFICE O/P EST MOD 30 MIN: CPT | Mod: 25

## 2023-09-13 PROCEDURE — 36415 COLL VENOUS BLD VENIPUNCTURE: CPT

## 2023-09-18 ENCOUNTER — NON-APPOINTMENT (OUTPATIENT)
Age: 48
End: 2023-09-18

## 2023-09-18 ENCOUNTER — APPOINTMENT (OUTPATIENT)
Dept: INTERNAL MEDICINE | Facility: CLINIC | Age: 48
End: 2023-09-18
Payer: COMMERCIAL

## 2023-09-18 VITALS
DIASTOLIC BLOOD PRESSURE: 79 MMHG | HEART RATE: 72 BPM | SYSTOLIC BLOOD PRESSURE: 123 MMHG | OXYGEN SATURATION: 99 % | HEIGHT: 66 IN | BODY MASS INDEX: 29.09 KG/M2 | WEIGHT: 181 LBS | TEMPERATURE: 98.6 F

## 2023-09-18 DIAGNOSIS — L64.9 ANDROGENIC ALOPECIA, UNSPECIFIED: ICD-10-CM

## 2023-09-18 DIAGNOSIS — E22.1 HYPERPROLACTINEMIA: ICD-10-CM

## 2023-09-18 DIAGNOSIS — M26.629 ARTHRALGIA OF TEMPOROMANDIBULAR JOINT,: ICD-10-CM

## 2023-09-18 DIAGNOSIS — R06.83 SNORING: ICD-10-CM

## 2023-09-18 DIAGNOSIS — G44.89 OTHER HEADACHE SYNDROME: ICD-10-CM

## 2023-09-18 DIAGNOSIS — Z00.00 ENCOUNTER FOR GENERAL ADULT MEDICAL EXAMINATION W/OUT ABNORMAL FINDINGS: ICD-10-CM

## 2023-09-18 PROCEDURE — 93000 ELECTROCARDIOGRAM COMPLETE: CPT | Mod: 59

## 2023-09-18 PROCEDURE — 36415 COLL VENOUS BLD VENIPUNCTURE: CPT

## 2023-09-18 PROCEDURE — 99396 PREV VISIT EST AGE 40-64: CPT | Mod: 25

## 2023-09-18 RX ORDER — SERTRALINE 25 MG/1
25 TABLET, FILM COATED ORAL DAILY
Qty: 5 | Refills: 0 | Status: DISCONTINUED | COMMUNITY
Start: 2023-08-28 | End: 2023-09-18

## 2023-09-18 RX ORDER — SERTRALINE HYDROCHLORIDE 50 MG/1
50 TABLET, FILM COATED ORAL DAILY
Qty: 30 | Refills: 0 | Status: DISCONTINUED | COMMUNITY
Start: 2023-08-28 | End: 2023-09-18

## 2023-09-19 LAB
ALBUMIN SERPL ELPH-MCNC: 4.7 G/DL
ALP BLD-CCNC: 54 U/L
ALT SERPL-CCNC: 28 U/L
ANION GAP SERPL CALC-SCNC: 15 MMOL/L
AST SERPL-CCNC: 20 U/L
BILIRUB SERPL-MCNC: 0.4 MG/DL
BUN SERPL-MCNC: 17 MG/DL
CALCIUM SERPL-MCNC: 10.1 MG/DL
CHLORIDE SERPL-SCNC: 99 MMOL/L
CO2 SERPL-SCNC: 26 MMOL/L
CORTIS SERPL-MCNC: 18.4 UG/DL
CREAT SERPL-MCNC: 1.32 MG/DL
EGFR: 67 ML/MIN/1.73M2
FSH SERPL-MCNC: 8.7 IU/L
GLUCOSE SERPL-MCNC: 113 MG/DL
IGF-1 INTERP: NORMAL
IGF-I BLD-MCNC: 207 NG/ML
LH SERPL-ACNC: 8.8 IU/L
POTASSIUM SERPL-SCNC: 4.3 MMOL/L
PROLACTIN SERPL-MCNC: 28.2 NG/ML
PROT SERPL-MCNC: 7.4 G/DL
SODIUM SERPL-SCNC: 139 MMOL/L
T4 FREE SERPL-MCNC: 1.3 NG/DL
TESTOST FREE SERPL-MCNC: 3.1 PG/ML
TESTOST SERPL-MCNC: 397 NG/DL
TSH SERPL-ACNC: 1.92 UIU/ML

## 2023-09-21 ENCOUNTER — OUTPATIENT (OUTPATIENT)
Dept: OUTPATIENT SERVICES | Facility: HOSPITAL | Age: 48
LOS: 1 days | End: 2023-09-21
Payer: COMMERCIAL

## 2023-09-21 ENCOUNTER — APPOINTMENT (OUTPATIENT)
Dept: MRI IMAGING | Facility: CLINIC | Age: 48
End: 2023-09-21
Payer: COMMERCIAL

## 2023-09-21 ENCOUNTER — APPOINTMENT (OUTPATIENT)
Dept: GASTROENTEROLOGY | Facility: CLINIC | Age: 48
End: 2023-09-21
Payer: COMMERCIAL

## 2023-09-21 VITALS
OXYGEN SATURATION: 97 % | HEIGHT: 66 IN | HEART RATE: 75 BPM | RESPIRATION RATE: 12 BRPM | SYSTOLIC BLOOD PRESSURE: 118 MMHG | DIASTOLIC BLOOD PRESSURE: 78 MMHG | TEMPERATURE: 97.7 F | BODY MASS INDEX: 29.09 KG/M2 | WEIGHT: 181 LBS

## 2023-09-21 DIAGNOSIS — Z12.11 ENCOUNTER FOR SCREENING FOR MALIGNANT NEOPLASM OF COLON: ICD-10-CM

## 2023-09-21 DIAGNOSIS — E22.1 HYPERPROLACTINEMIA: ICD-10-CM

## 2023-09-21 DIAGNOSIS — Z83.71 FAMILY HISTORY OF COLONIC POLYPS: ICD-10-CM

## 2023-09-21 DIAGNOSIS — Z83.71 ENCOUNTER FOR SCREENING FOR MALIGNANT NEOPLASM OF COLON: ICD-10-CM

## 2023-09-21 DIAGNOSIS — G44.89 OTHER HEADACHE SYNDROME: ICD-10-CM

## 2023-09-21 DIAGNOSIS — Z78.9 OTHER SPECIFIED HEALTH STATUS: ICD-10-CM

## 2023-09-21 PROCEDURE — 70553 MRI BRAIN STEM W/O & W/DYE: CPT | Mod: 26

## 2023-09-21 PROCEDURE — 99204 OFFICE O/P NEW MOD 45 MIN: CPT

## 2023-09-21 PROCEDURE — A9585: CPT

## 2023-09-21 PROCEDURE — 70553 MRI BRAIN STEM W/O & W/DYE: CPT

## 2023-09-22 ENCOUNTER — TRANSCRIPTION ENCOUNTER (OUTPATIENT)
Age: 48
End: 2023-09-22

## 2023-09-22 PROBLEM — Z12.11 COLON CANCER SCREENING: Status: ACTIVE | Noted: 2023-09-22

## 2023-09-22 PROBLEM — Z12.11 ENCOUNTER FOR COLONOSCOPY IN PATIENT WITH FAMILY HISTORY OF COLON POLYPS: Status: ACTIVE | Noted: 2023-09-22 | Resolved: 2023-10-06

## 2023-09-22 PROBLEM — Z78.9 SOCIAL ALCOHOL USE: Status: ACTIVE | Noted: 2023-09-22

## 2023-09-22 PROBLEM — Z78.9 NON-SMOKER: Status: ACTIVE | Noted: 2023-09-22

## 2023-09-22 PROBLEM — Z83.71 FAMILY HISTORY OF COLONIC POLYPS: Status: ACTIVE | Noted: 2023-09-22

## 2023-09-22 RX ORDER — SODIUM SULFATE, POTASSIUM SULFATE AND MAGNESIUM SULFATE 1.6; 3.13; 17.5 G/177ML; G/177ML; G/177ML
17.5-3.13-1.6 SOLUTION ORAL TWICE DAILY
Qty: 2 | Refills: 0 | Status: ACTIVE | COMMUNITY
Start: 2023-09-22 | End: 1900-01-01

## 2023-09-22 RX ORDER — FINASTERIDE 1 MG/1
1 TABLET ORAL DAILY
Qty: 90 | Refills: 3 | Status: ACTIVE | COMMUNITY
Start: 2023-09-18 | End: 1900-01-01

## 2023-09-24 ENCOUNTER — TRANSCRIPTION ENCOUNTER (OUTPATIENT)
Age: 48
End: 2023-09-24

## 2023-09-24 LAB
25(OH)D3 SERPL-MCNC: 66.4 NG/ML
ALBUMIN SERPL ELPH-MCNC: 4.7 G/DL
ALP BLD-CCNC: 53 U/L
ALT SERPL-CCNC: 26 U/L
ANION GAP SERPL CALC-SCNC: 14 MMOL/L
APPEARANCE: CLEAR
AST SERPL-CCNC: 19 U/L
BASOPHILS # BLD AUTO: 0.06 K/UL
BASOPHILS NFR BLD AUTO: 0.9 %
BILIRUB SERPL-MCNC: 0.4 MG/DL
BILIRUBIN URINE: NEGATIVE
BLOOD URINE: NEGATIVE
BUN SERPL-MCNC: 17 MG/DL
CALCIUM SERPL-MCNC: 9.1 MG/DL
CHLORIDE SERPL-SCNC: 103 MMOL/L
CHOLEST SERPL-MCNC: 221 MG/DL
CO2 SERPL-SCNC: 26 MMOL/L
COLOR: YELLOW
CREAT SERPL-MCNC: 1.32 MG/DL
EGFR: 67 ML/MIN/1.73M2
EOSINOPHIL # BLD AUTO: 0.1 K/UL
EOSINOPHIL NFR BLD AUTO: 1.5 %
ESTIMATED AVERAGE GLUCOSE: 111 MG/DL
GLUCOSE QUALITATIVE U: NEGATIVE MG/DL
GLUCOSE SERPL-MCNC: 80 MG/DL
HBA1C MFR BLD HPLC: 5.5 %
HCT VFR BLD CALC: 42.3 %
HDLC SERPL-MCNC: 56 MG/DL
HGB BLD-MCNC: 14.6 G/DL
IMM GRANULOCYTES NFR BLD AUTO: 0.1 %
KETONES URINE: NEGATIVE MG/DL
LDLC SERPL CALC-MCNC: 139 MG/DL
LEUKOCYTE ESTERASE URINE: NEGATIVE
LYMPHOCYTES # BLD AUTO: 1.55 K/UL
LYMPHOCYTES NFR BLD AUTO: 23.2 %
MAN DIFF?: NORMAL
MCHC RBC-ENTMCNC: 30.4 PG
MCHC RBC-ENTMCNC: 34.5 GM/DL
MCV RBC AUTO: 87.9 FL
MONOCYTES # BLD AUTO: 0.51 K/UL
MONOCYTES NFR BLD AUTO: 7.6 %
NEUTROPHILS # BLD AUTO: 4.44 K/UL
NEUTROPHILS NFR BLD AUTO: 66.7 %
NITRITE URINE: NEGATIVE
NONHDLC SERPL-MCNC: 165 MG/DL
PH URINE: 7.5
PLATELET # BLD AUTO: 238 K/UL
POTASSIUM SERPL-SCNC: 4.2 MMOL/L
PROT SERPL-MCNC: 7.1 G/DL
PROTEIN URINE: NEGATIVE MG/DL
PSA SERPL-MCNC: 0.41 NG/ML
RBC # BLD: 4.81 M/UL
RBC # FLD: 13.3 %
SODIUM SERPL-SCNC: 142 MMOL/L
SPECIFIC GRAVITY URINE: 1.02
TRIGL SERPL-MCNC: 143 MG/DL
TSH SERPL-ACNC: 0.84 UIU/ML
UROBILINOGEN URINE: 0.2 MG/DL
VIT B12 SERPL-MCNC: >2000 PG/ML
WBC # FLD AUTO: 6.67 K/UL

## 2023-09-25 ENCOUNTER — TRANSCRIPTION ENCOUNTER (OUTPATIENT)
Age: 48
End: 2023-09-25

## 2023-09-27 ENCOUNTER — TRANSCRIPTION ENCOUNTER (OUTPATIENT)
Age: 48
End: 2023-09-27

## 2023-10-03 LAB
MONOMERIC PROLACTIN (ICMA)*: 23.2 NG/ML
PERCENT MACROPROLACTIN: 13 %
PROLACTIN, SERUM (ICMA)*: 26.6 NG/ML

## 2023-10-04 ENCOUNTER — APPOINTMENT (OUTPATIENT)
Dept: UROLOGY | Facility: CLINIC | Age: 48
End: 2023-10-04
Payer: COMMERCIAL

## 2023-10-04 VITALS
OXYGEN SATURATION: 97 % | HEIGHT: 66 IN | DIASTOLIC BLOOD PRESSURE: 79 MMHG | WEIGHT: 181 LBS | BODY MASS INDEX: 29.09 KG/M2 | HEART RATE: 77 BPM | SYSTOLIC BLOOD PRESSURE: 116 MMHG | TEMPERATURE: 98.6 F

## 2023-10-04 PROCEDURE — 99203 OFFICE O/P NEW LOW 30 MIN: CPT

## 2023-10-09 ENCOUNTER — TRANSCRIPTION ENCOUNTER (OUTPATIENT)
Age: 48
End: 2023-10-09

## 2023-10-10 ENCOUNTER — APPOINTMENT (OUTPATIENT)
Dept: PSYCHIATRY | Facility: CLINIC | Age: 48
End: 2023-10-10
Payer: COMMERCIAL

## 2023-10-10 PROCEDURE — 99214 OFFICE O/P EST MOD 30 MIN: CPT | Mod: 95

## 2023-10-25 ENCOUNTER — TRANSCRIPTION ENCOUNTER (OUTPATIENT)
Age: 48
End: 2023-10-25

## 2023-11-21 ENCOUNTER — RESULT REVIEW (OUTPATIENT)
Age: 48
End: 2023-11-21

## 2023-11-21 ENCOUNTER — APPOINTMENT (OUTPATIENT)
Dept: GASTROENTEROLOGY | Facility: AMBULATORY SURGERY CENTER | Age: 48
End: 2023-11-21
Payer: COMMERCIAL

## 2023-11-21 PROCEDURE — 45380 COLONOSCOPY AND BIOPSY: CPT

## 2023-12-06 ENCOUNTER — APPOINTMENT (OUTPATIENT)
Dept: PSYCHIATRY | Facility: CLINIC | Age: 48
End: 2023-12-06
Payer: COMMERCIAL

## 2023-12-06 PROCEDURE — 99214 OFFICE O/P EST MOD 30 MIN: CPT | Mod: 95

## 2023-12-06 RX ORDER — SERTRALINE 25 MG/1
25 TABLET, FILM COATED ORAL DAILY
Qty: 5 | Refills: 0 | Status: ACTIVE | COMMUNITY
Start: 2023-12-06 | End: 1900-01-01

## 2023-12-06 RX ORDER — DEXTROAMPHETAMINE SACCHARATE, AMPHETAMINE ASPARTATE MONOHYDRATE, DEXTROAMPHETAMINE SULFATE AND AMPHETAMINE SULFATE 2.5; 2.5; 2.5; 2.5 MG/1; MG/1; MG/1; MG/1
10 CAPSULE, EXTENDED RELEASE ORAL
Qty: 30 | Refills: 0 | Status: ACTIVE | COMMUNITY
Start: 2022-09-28 | End: 1900-01-01

## 2023-12-20 ENCOUNTER — TRANSCRIPTION ENCOUNTER (OUTPATIENT)
Age: 48
End: 2023-12-20

## 2023-12-20 LAB
25(OH)D3 SERPL-MCNC: 74.9 NG/ML
ACTH-ESO: 16 PG/ML
ALBUMIN SERPL ELPH-MCNC: 5.2 G/DL
ALP BLD-CCNC: 51 U/L
ALT SERPL-CCNC: 23 U/L
ANION GAP SERPL CALC-SCNC: 13 MMOL/L
APPEARANCE: CLEAR
AST SERPL-CCNC: 21 U/L
BILIRUB SERPL-MCNC: 0.6 MG/DL
BILIRUBIN URINE: NEGATIVE
BLOOD URINE: NEGATIVE
BUN SERPL-MCNC: 18 MG/DL
CALCIUM SERPL-MCNC: 10.1 MG/DL
CHLORIDE SERPL-SCNC: 97 MMOL/L
CHOLEST SERPL-MCNC: 235 MG/DL
CO2 SERPL-SCNC: 27 MMOL/L
COLOR: YELLOW
CREAT SERPL-MCNC: 1.43 MG/DL
EGFR: 61 ML/MIN/1.73M2
ESTIMATED AVERAGE GLUCOSE: 100 MG/DL
FSH SERPL-MCNC: 8.1 IU/L
GLUCOSE QUALITATIVE U: NEGATIVE MG/DL
GLUCOSE SERPL-MCNC: 111 MG/DL
HBA1C MFR BLD HPLC: 5.1 %
HDLC SERPL-MCNC: 51 MG/DL
KETONES URINE: NEGATIVE MG/DL
LDLC SERPL CALC-MCNC: 171 MG/DL
LEUKOCYTE ESTERASE URINE: NEGATIVE
LH SERPL-ACNC: 6.5 IU/L
NITRITE URINE: NEGATIVE
NONHDLC SERPL-MCNC: 184 MG/DL
PH URINE: 7
POTASSIUM SERPL-SCNC: 4.1 MMOL/L
PROLACTIN SERPL-MCNC: 2.3 NG/ML
PROT SERPL-MCNC: 7.8 G/DL
PROTEIN URINE: NEGATIVE MG/DL
SODIUM SERPL-SCNC: 137 MMOL/L
SPECIFIC GRAVITY URINE: 1.01
TESTOST FREE SERPL-MCNC: 11.3 PG/ML
TESTOST SERPL-MCNC: 565 NG/DL
TRIGL SERPL-MCNC: 79 MG/DL
TSH SERPL-ACNC: 1.32 UIU/ML
UROBILINOGEN URINE: 0.2 MG/DL
VIT B12 SERPL-MCNC: 1373 PG/ML

## 2023-12-23 LAB
CORTICOSTEROID BIND GLOBULIN: 2.7 MG/DL
CORTIS SERPL-MCNC: 14 UG/DL
CORTISOL, FREE: 0.87 UG/DL
PFCX: 6.2 %

## 2024-01-08 ENCOUNTER — APPOINTMENT (OUTPATIENT)
Dept: UROLOGY | Facility: CLINIC | Age: 49
End: 2024-01-08
Payer: COMMERCIAL

## 2024-01-08 VITALS
HEART RATE: 66 BPM | SYSTOLIC BLOOD PRESSURE: 133 MMHG | HEIGHT: 66 IN | OXYGEN SATURATION: 97 % | DIASTOLIC BLOOD PRESSURE: 83 MMHG | BODY MASS INDEX: 29.09 KG/M2 | WEIGHT: 181 LBS | TEMPERATURE: 97.2 F

## 2024-01-08 DIAGNOSIS — Z30.2 ENCOUNTER FOR STERILIZATION: ICD-10-CM

## 2024-01-08 PROCEDURE — 55250 REMOVAL OF SPERM DUCT(S): CPT

## 2024-01-26 ENCOUNTER — APPOINTMENT (OUTPATIENT)
Dept: UROLOGY | Facility: CLINIC | Age: 49
End: 2024-01-26
Payer: COMMERCIAL

## 2024-01-26 VITALS
OXYGEN SATURATION: 97 % | WEIGHT: 181 LBS | RESPIRATION RATE: 16 BRPM | DIASTOLIC BLOOD PRESSURE: 75 MMHG | HEIGHT: 66 IN | BODY MASS INDEX: 29.09 KG/M2 | TEMPERATURE: 97.6 F | SYSTOLIC BLOOD PRESSURE: 110 MMHG | HEART RATE: 68 BPM

## 2024-01-26 DIAGNOSIS — Z30.09 ENCOUNTER FOR OTHER GENERAL COUNSELING AND ADVICE ON CONTRACEPTION: ICD-10-CM

## 2024-01-26 PROCEDURE — 99024 POSTOP FOLLOW-UP VISIT: CPT

## 2024-01-26 NOTE — ASSESSMENT
[FreeTextEntry1] : Very pleasant 48-year-old gentleman status post vasectomy -Semen analysis in 3 months -We discussed that he is still able to father a child at this time.  He should still continue to use an alternative form of contraception until semen analysis demonstrates sterility -Follow-up in 3 months

## 2024-01-26 NOTE — PHYSICAL EXAM
[Normal Appearance] : normal appearance [Well Groomed] : well groomed [General Appearance - In No Acute Distress] : no acute distress [Edema] : no peripheral edema [Respiration, Rhythm And Depth] : normal respiratory rhythm and effort [Exaggerated Use Of Accessory Muscles For Inspiration] : no accessory muscle use [Abdomen Tenderness] : non-tender [Abdomen Soft] : soft [Costovertebral Angle Tenderness] : no ~M costovertebral angle tenderness [Urinary Bladder Findings] : the bladder was normal on palpation [Normal Station and Gait] : the gait and station were normal for the patient's age [] : no rash [Oriented To Time, Place, And Person] : oriented to person, place, and time [No Focal Deficits] : no focal deficits [Affect] : the affect was normal [No Palpable Adenopathy] : no palpable adenopathy [Mood] : the mood was normal [de-identified] : Scrotal incision well-healed

## 2024-01-26 NOTE — HISTORY OF PRESENT ILLNESS
[FreeTextEntry1] : Very pleasant 48-year-old gentleman status post vasectomy approximately 2 weeks ago.  He feels well.  He reports mild sensitivity of the right testicle but no overt pain.  He is back to the gym.  He has started having sex again and reports that he is doing so with protection.

## 2024-02-16 RX ORDER — SERTRALINE HYDROCHLORIDE 50 MG/1
50 TABLET, FILM COATED ORAL
Qty: 90 | Refills: 0 | Status: ACTIVE | COMMUNITY
Start: 2023-12-06 | End: 1900-01-01

## 2024-02-21 ENCOUNTER — APPOINTMENT (OUTPATIENT)
Dept: PSYCHIATRY | Facility: CLINIC | Age: 49
End: 2024-02-21
Payer: COMMERCIAL

## 2024-02-21 PROCEDURE — ZZZZZ: CPT

## 2024-02-21 RX ORDER — BUPROPION HYDROCHLORIDE 300 MG/1
300 TABLET, EXTENDED RELEASE ORAL
Qty: 90 | Refills: 0 | Status: ACTIVE | COMMUNITY
Start: 2023-02-03 | End: 1900-01-01

## 2024-02-21 NOTE — PLAN
[FreeTextEntry4] : Assessment: Patient is a 47 yo male with h/o ADHD, depression and anxiety seen today for medication management. Patient is compliant with his medications, tolerating it well without any side effects. I-STOP was checked without any problems.  I-STOP: Patient Name: Darren Harrison Date: 1975 Address: [address] [place] [id number]: Male Rx Written	Rx Dispensed	Drug	Quantity	Days Supply	Prescriber Name	Prescriber Daysi #	Payment Method	Dispenser 08/16/2022	08/21/2022	dextroamp-amphetamin 10 mg tab	30	30	Vlantis, Dmitry	[id number]	Insurance	[facility] #[id number] 08/16/2022	08/21/2022	dextroamp-amphetamin 20 mg tab	30	30	Vlantis, [place]	[id number]	Insurance	[facility] #[id number] 07/15/2022	07/17/2022	dextroamp-amphetamin 20 mg tab	30	30	Vlantis, [place]	[id number]	Insurance	[facility] #[id number] 07/15/2022	07/17/2022	dextroamp-amphetamin 10 mg tab	30	30	Vlantis, Dmitry	[id number]	Insurance	[facility] #[id number] 05/18/2022	05/19/2022	dextroamp-amphetamin 20 mg tab	60	30	Fermin Josue MD	[id number]	Insurance	[facility] #[id number]  PLAN: PRL: 8/30/2023 26.6. 9/13/2023 28.2.  Continues to be on Cabergoline. MacroPRL: 26.6.  Continue Zoloft 50 mg PO QD for depression and anxiety. Never took it the first time.  D/C Adderall 10 mg PO QD for ADHD Continue Wellbutrin  mg PO QAM for depression, low motivation, energy, concentration and decrease SSRI induced sexual dysfunction.  D/C Vistaril 10 mg PO QHS for insomnia. too strong D/C Lexapro from 10 to 15 mg PO QAM for depression and anxiety. Patient stopped on his own. "Made me feel flat and states he had anxiety taking ti only after 1 week." D/C Trazodone 50 mg PO QHS for insomnia - Discussed risks and benefits of medications including side effects of GI and sexual with SSRI. Alternative strategies including no intervention discussed with patient. Patient consents to current medications as prescribed. - Discussed with patient regarding importance of abstinence and sobriety from alcohol and drugs. Educated about relationship between worsening mood/anxiety symptoms and drug use and improvement of symptoms with abstinence.  - Discussed about unpredictable effects including cardiorespiratory collapse from the combination of illicit drugs and prescribed medications. Patient verbalized understanding. - Patient understands to contact clinic prn with concerns and agrees to call 911 or go to nearest ER if symptoms worsen. - Next appointment made in 3 month. Patient was not in any distress.

## 2024-02-21 NOTE — HISTORY OF PRESENT ILLNESS
[FreeTextEntry1] : The following service was provided using telehealth between writer/provider and patient. The patient was at home. The writer was at clinic. Patient was alone and consented to telehealth format. All treatment plans through and including today's date were reviewed with the patient.  Patient is seen for the 2 month follow-up visit via telehealth.  At that time Zoloft 50 mg was started on the patient. States he is feeling so good. Has not smoked marijuana in 2 months. Has not been on Adderall in 2 months which he is happy about as he does not like the crash on it.   Mood: less depression and anxiety. Less emotional. Less crying.  Sleeping: great. 8 hours   Appetite is good Energy is good  Concentration and motivation is both decreased and is working on it.  Denies any AVH, SI or HI.

## 2024-02-21 NOTE — SOCIAL HISTORY
[FreeTextEntry1] : Born: [place], [place]\par  Siblings: 1/2 sister\par  Parents:   when he was 5 yoa. states he grew up in a  toxic environment. \par  Education: J.D.\par  Employment.  for 20 years\par  /Kids:  10 years ago and now engaged to be . Has 13 yo son and 10 yo daughter form the first marriage and 7 month old daughter from current  relationship. \par  Abuse: denies\par  Legal:  8/2020 DWI\par

## 2024-06-03 ENCOUNTER — NON-APPOINTMENT (OUTPATIENT)
Age: 49
End: 2024-06-03

## 2024-06-08 ENCOUNTER — RX RENEWAL (OUTPATIENT)
Age: 49
End: 2024-06-08

## 2024-06-08 RX ORDER — CABERGOLINE 0.5 MG/1
0.5 TABLET ORAL
Qty: 12 | Refills: 2 | Status: ACTIVE | COMMUNITY
Start: 2022-08-03 | End: 1900-01-01

## 2024-07-03 ENCOUNTER — APPOINTMENT (OUTPATIENT)
Dept: PSYCHIATRY | Facility: CLINIC | Age: 49
End: 2024-07-03
Payer: COMMERCIAL

## 2024-07-03 PROCEDURE — 99214 OFFICE O/P EST MOD 30 MIN: CPT | Mod: 95

## 2024-09-21 ENCOUNTER — RX RENEWAL (OUTPATIENT)
Age: 49
End: 2024-09-21

## 2024-10-08 ENCOUNTER — APPOINTMENT (OUTPATIENT)
Dept: PSYCHIATRY | Facility: CLINIC | Age: 49
End: 2024-10-08
Payer: COMMERCIAL

## 2024-10-08 PROCEDURE — 99214 OFFICE O/P EST MOD 30 MIN: CPT | Mod: 95

## 2024-11-11 ENCOUNTER — NON-APPOINTMENT (OUTPATIENT)
Age: 49
End: 2024-11-11

## 2024-11-18 RX ORDER — FLUTICASONE PROPIONATE AND SALMETEROL 50; 100 UG/1; UG/1
100-50 POWDER RESPIRATORY (INHALATION)
Qty: 1 | Refills: 0 | Status: ACTIVE | COMMUNITY
Start: 2024-11-18 | End: 1900-01-01

## 2025-01-08 ENCOUNTER — APPOINTMENT (OUTPATIENT)
Dept: PSYCHIATRY | Facility: CLINIC | Age: 50
End: 2025-01-08
Payer: COMMERCIAL

## 2025-01-08 PROCEDURE — 99214 OFFICE O/P EST MOD 30 MIN: CPT | Mod: 95

## 2025-01-08 RX ORDER — DEXTROAMPHETAMINE SACCHARATE, AMPHETAMINE ASPARTATE, DEXTROAMPHETAMINE SULFATE AND AMPHETAMINE SULFATE 1.25; 1.25; 1.25; 1.25 MG/1; MG/1; MG/1; MG/1
5 TABLET ORAL
Qty: 30 | Refills: 0 | Status: ACTIVE | COMMUNITY
Start: 2025-01-08 | End: 1900-01-01

## 2025-02-19 ENCOUNTER — APPOINTMENT (OUTPATIENT)
Dept: INTERNAL MEDICINE | Facility: CLINIC | Age: 50
End: 2025-02-19

## 2025-02-24 ENCOUNTER — APPOINTMENT (OUTPATIENT)
Dept: INTERNAL MEDICINE | Facility: CLINIC | Age: 50
End: 2025-02-24
Payer: COMMERCIAL

## 2025-02-24 VITALS
WEIGHT: 176 LBS | SYSTOLIC BLOOD PRESSURE: 116 MMHG | TEMPERATURE: 98 F | BODY MASS INDEX: 28.28 KG/M2 | HEART RATE: 70 BPM | HEIGHT: 66 IN | OXYGEN SATURATION: 98 % | DIASTOLIC BLOOD PRESSURE: 79 MMHG

## 2025-02-24 DIAGNOSIS — R06.83 SNORING: ICD-10-CM

## 2025-02-24 DIAGNOSIS — K21.9 GASTRO-ESOPHAGEAL REFLUX DISEASE W/OUT ESOPHAGITIS: ICD-10-CM

## 2025-02-24 PROCEDURE — G2211 COMPLEX E/M VISIT ADD ON: CPT | Mod: NC

## 2025-02-24 PROCEDURE — 99214 OFFICE O/P EST MOD 30 MIN: CPT

## 2025-02-24 RX ORDER — FAMOTIDINE 40 MG/1
40 TABLET, FILM COATED ORAL
Qty: 90 | Refills: 2 | Status: ACTIVE | COMMUNITY
Start: 2025-02-24 | End: 1900-01-01

## 2025-02-25 ENCOUNTER — TRANSCRIPTION ENCOUNTER (OUTPATIENT)
Age: 50
End: 2025-02-25

## 2025-03-07 ENCOUNTER — OUTPATIENT (OUTPATIENT)
Dept: OUTPATIENT SERVICES | Facility: HOSPITAL | Age: 50
LOS: 1 days | End: 2025-03-07
Payer: COMMERCIAL

## 2025-03-07 DIAGNOSIS — G47.33 OBSTRUCTIVE SLEEP APNEA (ADULT) (PEDIATRIC): ICD-10-CM

## 2025-03-07 PROCEDURE — 95800 SLP STDY UNATTENDED: CPT

## 2025-03-07 PROCEDURE — 95800 SLP STDY UNATTENDED: CPT | Mod: 26

## 2025-03-14 ENCOUNTER — APPOINTMENT (OUTPATIENT)
Dept: PSYCHIATRY | Facility: CLINIC | Age: 50
End: 2025-03-14
Payer: COMMERCIAL

## 2025-03-14 PROCEDURE — 99214 OFFICE O/P EST MOD 30 MIN: CPT | Mod: 95

## 2025-03-17 ENCOUNTER — TRANSCRIPTION ENCOUNTER (OUTPATIENT)
Age: 50
End: 2025-03-17

## 2025-03-17 ENCOUNTER — RX RENEWAL (OUTPATIENT)
Age: 50
End: 2025-03-17

## 2025-03-28 ENCOUNTER — APPOINTMENT (OUTPATIENT)
Dept: PULMONOLOGY | Facility: CLINIC | Age: 50
End: 2025-03-28
Payer: COMMERCIAL

## 2025-03-28 VITALS
OXYGEN SATURATION: 98 % | SYSTOLIC BLOOD PRESSURE: 122 MMHG | HEART RATE: 64 BPM | DIASTOLIC BLOOD PRESSURE: 90 MMHG | HEIGHT: 66 IN | TEMPERATURE: 97.6 F | WEIGHT: 177 LBS | BODY MASS INDEX: 28.45 KG/M2

## 2025-03-28 DIAGNOSIS — G47.30 SLEEP APNEA, UNSPECIFIED: ICD-10-CM

## 2025-03-28 PROCEDURE — 99204 OFFICE O/P NEW MOD 45 MIN: CPT

## 2025-04-22 ENCOUNTER — NON-APPOINTMENT (OUTPATIENT)
Age: 50
End: 2025-04-22

## 2025-04-29 ENCOUNTER — NON-APPOINTMENT (OUTPATIENT)
Age: 50
End: 2025-04-29

## 2025-05-13 ENCOUNTER — APPOINTMENT (OUTPATIENT)
Dept: PSYCHIATRY | Facility: CLINIC | Age: 50
End: 2025-05-13
Payer: COMMERCIAL

## 2025-05-13 PROCEDURE — 99214 OFFICE O/P EST MOD 30 MIN: CPT | Mod: 95

## 2025-05-13 RX ORDER — VENLAFAXINE HYDROCHLORIDE 37.5 MG/1
37.5 CAPSULE, EXTENDED RELEASE ORAL DAILY
Qty: 30 | Refills: 1 | Status: ACTIVE | COMMUNITY
Start: 2025-05-13 | End: 1900-01-01

## 2025-06-17 ENCOUNTER — APPOINTMENT (OUTPATIENT)
Dept: PSYCHIATRY | Facility: CLINIC | Age: 50
End: 2025-06-17
Payer: COMMERCIAL

## 2025-06-17 PROCEDURE — 90791 PSYCH DIAGNOSTIC EVALUATION: CPT | Mod: 95

## 2025-06-23 ENCOUNTER — APPOINTMENT (OUTPATIENT)
Dept: PSYCHIATRY | Facility: CLINIC | Age: 50
End: 2025-06-23
Payer: COMMERCIAL

## 2025-06-23 PROCEDURE — 90834 PSYTX W PT 45 MINUTES: CPT | Mod: 95

## 2025-06-25 ENCOUNTER — APPOINTMENT (OUTPATIENT)
Dept: INTERNAL MEDICINE | Facility: CLINIC | Age: 50
End: 2025-06-25
Payer: COMMERCIAL

## 2025-06-25 VITALS
OXYGEN SATURATION: 98 % | WEIGHT: 180 LBS | SYSTOLIC BLOOD PRESSURE: 123 MMHG | HEIGHT: 66 IN | BODY MASS INDEX: 28.93 KG/M2 | HEART RATE: 63 BPM | DIASTOLIC BLOOD PRESSURE: 87 MMHG | TEMPERATURE: 97.6 F

## 2025-06-25 PROCEDURE — 36415 COLL VENOUS BLD VENIPUNCTURE: CPT

## 2025-06-25 PROCEDURE — 99396 PREV VISIT EST AGE 40-64: CPT

## 2025-06-25 PROCEDURE — 93000 ELECTROCARDIOGRAM COMPLETE: CPT

## 2025-06-25 RX ORDER — FAMOTIDINE 40 MG/1
40 TABLET, FILM COATED ORAL DAILY
Qty: 90 | Refills: 3 | Status: ACTIVE | COMMUNITY
Start: 2025-06-25 | End: 1900-01-01

## 2025-07-01 ENCOUNTER — APPOINTMENT (OUTPATIENT)
Dept: PSYCHIATRY | Facility: CLINIC | Age: 50
End: 2025-07-01
Payer: COMMERCIAL

## 2025-07-01 PROCEDURE — 90834 PSYTX W PT 45 MINUTES: CPT | Mod: 95

## 2025-07-02 PROBLEM — R79.89 ELEVATED SERUM CREATININE: Status: ACTIVE | Noted: 2025-07-02

## 2025-07-02 LAB
25(OH)D3 SERPL-MCNC: 70.2 NG/ML
ACTH-ESO: 14 PG/ML
ALBUMIN SERPL ELPH-MCNC: 4.4 G/DL
ALP BLD-CCNC: 49 U/L
ALT SERPL-CCNC: 28 U/L
ANION GAP SERPL CALC-SCNC: 15 MMOL/L
APPEARANCE: CLEAR
AST SERPL-CCNC: 20 U/L
BILIRUB SERPL-MCNC: 0.2 MG/DL
BILIRUBIN URINE: NEGATIVE
BLOOD URINE: NEGATIVE
BUN SERPL-MCNC: 17 MG/DL
CALCIUM SERPL-MCNC: 9.3 MG/DL
CHLORIDE SERPL-SCNC: 104 MMOL/L
CHOLEST SERPL-MCNC: 221 MG/DL
CO2 SERPL-SCNC: 24 MMOL/L
COLOR: YELLOW
CORTISOL: 14.3 UG/DL
CREAT SERPL-MCNC: 1.61 MG/DL
EGFRCR SERPLBLD CKD-EPI 2021: 52 ML/MIN/1.73M2
ESTIMATED AVERAGE GLUCOSE: 111 MG/DL
GLUCOSE QUALITATIVE U: NEGATIVE MG/DL
GLUCOSE SERPL-MCNC: 100 MG/DL
HBA1C MFR BLD HPLC: 5.5 %
HCT VFR BLD CALC: 42.2 %
HDLC SERPL-MCNC: 63 MG/DL
HGB BLD-MCNC: 14.4 G/DL
IGF-1 INTERP: NORMAL
IGF-I BLD-MCNC: 203 NG/ML
KETONES URINE: ABNORMAL MG/DL
LDLC SERPL-MCNC: 145 MG/DL
LEUKOCYTE ESTERASE URINE: NEGATIVE
MCHC RBC-ENTMCNC: 29.9 PG
MCHC RBC-ENTMCNC: 34.1 G/DL
MCV RBC AUTO: 87.6 FL
NITRITE URINE: NEGATIVE
NONHDLC SERPL-MCNC: 158 MG/DL
PH URINE: 6.5
PLATELET # BLD AUTO: 236 K/UL
POTASSIUM SERPL-SCNC: 4.1 MMOL/L
PROLACTIN SERPL-MCNC: 1.1 NG/ML
PROT SERPL-MCNC: 7.2 G/DL
PROTEIN URINE: NORMAL MG/DL
PSA SERPL-MCNC: 0.46 NG/ML
RBC # BLD: 4.82 M/UL
RBC # FLD: 13.6 %
SODIUM SERPL-SCNC: 143 MMOL/L
SPECIFIC GRAVITY URINE: >1.03
TESTOST FREE SERPL-MCNC: 12.5 PG/ML
TESTOST SERPL-MCNC: 764 NG/DL
TRIGL SERPL-MCNC: 77 MG/DL
TSH SERPL-ACNC: 1.87 UIU/ML
UROBILINOGEN URINE: 1 MG/DL
VIT B12 SERPL-MCNC: 687 PG/ML
WBC # FLD AUTO: 7.76 K/UL

## 2025-07-08 ENCOUNTER — APPOINTMENT (OUTPATIENT)
Dept: PSYCHIATRY | Facility: CLINIC | Age: 50
End: 2025-07-08
Payer: COMMERCIAL

## 2025-07-08 PROCEDURE — 90834 PSYTX W PT 45 MINUTES: CPT | Mod: 95

## 2025-07-15 ENCOUNTER — NON-APPOINTMENT (OUTPATIENT)
Age: 50
End: 2025-07-15

## 2025-07-15 ENCOUNTER — APPOINTMENT (OUTPATIENT)
Dept: PSYCHIATRY | Facility: CLINIC | Age: 50
End: 2025-07-15

## 2025-07-18 ENCOUNTER — APPOINTMENT (OUTPATIENT)
Dept: PSYCHIATRY | Facility: CLINIC | Age: 50
End: 2025-07-18
Payer: COMMERCIAL

## 2025-07-18 PROCEDURE — 99214 OFFICE O/P EST MOD 30 MIN: CPT | Mod: 95

## 2025-07-22 ENCOUNTER — APPOINTMENT (OUTPATIENT)
Dept: PSYCHIATRY | Facility: CLINIC | Age: 50
End: 2025-07-22
Payer: COMMERCIAL

## 2025-07-22 PROCEDURE — 90834 PSYTX W PT 45 MINUTES: CPT | Mod: 95

## 2025-07-29 ENCOUNTER — APPOINTMENT (OUTPATIENT)
Dept: PSYCHIATRY | Facility: CLINIC | Age: 50
End: 2025-07-29
Payer: COMMERCIAL

## 2025-07-29 DIAGNOSIS — F90.9 ATTENTION-DEFICIT HYPERACTIVITY DISORDER, UNSPECIFIED TYPE: ICD-10-CM

## 2025-07-29 PROCEDURE — 90834 PSYTX W PT 45 MINUTES: CPT | Mod: 95

## 2025-08-05 ENCOUNTER — APPOINTMENT (OUTPATIENT)
Dept: PSYCHIATRY | Facility: CLINIC | Age: 50
End: 2025-08-05
Payer: COMMERCIAL

## 2025-08-05 PROCEDURE — 90834 PSYTX W PT 45 MINUTES: CPT | Mod: 95

## 2025-08-12 ENCOUNTER — APPOINTMENT (OUTPATIENT)
Dept: PSYCHIATRY | Facility: CLINIC | Age: 50
End: 2025-08-12
Payer: COMMERCIAL

## 2025-08-12 DIAGNOSIS — F41.9 ANXIETY DISORDER, UNSPECIFIED: ICD-10-CM

## 2025-08-12 DIAGNOSIS — F32.A ANXIETY DISORDER, UNSPECIFIED: ICD-10-CM

## 2025-08-12 PROCEDURE — 90834 PSYTX W PT 45 MINUTES: CPT | Mod: 95

## 2025-08-19 ENCOUNTER — NON-APPOINTMENT (OUTPATIENT)
Age: 50
End: 2025-08-19

## 2025-08-19 ENCOUNTER — APPOINTMENT (OUTPATIENT)
Dept: PSYCHIATRY | Facility: CLINIC | Age: 50
End: 2025-08-19

## 2025-08-26 ENCOUNTER — APPOINTMENT (OUTPATIENT)
Dept: PSYCHIATRY | Facility: CLINIC | Age: 50
End: 2025-08-26

## 2025-09-02 ENCOUNTER — APPOINTMENT (OUTPATIENT)
Dept: PSYCHIATRY | Facility: CLINIC | Age: 50
End: 2025-09-02